# Patient Record
Sex: FEMALE | Race: WHITE | NOT HISPANIC OR LATINO | Employment: FULL TIME | RURAL
[De-identification: names, ages, dates, MRNs, and addresses within clinical notes are randomized per-mention and may not be internally consistent; named-entity substitution may affect disease eponyms.]

---

## 2022-04-04 DIAGNOSIS — Z11.59 SCREENING FOR VIRAL DISEASE: Primary | ICD-10-CM

## 2022-04-05 RX ORDER — SEMAGLUTIDE 1.34 MG/ML
INJECTION, SOLUTION SUBCUTANEOUS
COMMUNITY

## 2022-04-05 RX ORDER — TIZANIDINE 4 MG/1
4 TABLET ORAL EVERY 12 HOURS
COMMUNITY

## 2022-04-05 RX ORDER — OXYCODONE AND ACETAMINOPHEN 10; 325 MG/1; MG/1
1 TABLET ORAL EVERY 8 HOURS PRN
Status: ON HOLD | COMMUNITY
End: 2023-01-24 | Stop reason: HOSPADM

## 2022-04-05 RX ORDER — LEVOTHYROXINE SODIUM 25 UG/1
25 TABLET ORAL
COMMUNITY

## 2022-04-05 RX ORDER — DULAGLUTIDE 0.75 MG/.5ML
INJECTION, SOLUTION SUBCUTANEOUS
COMMUNITY

## 2022-04-06 ENCOUNTER — ANESTHESIA (OUTPATIENT)
Dept: PAIN MEDICINE | Facility: HOSPITAL | Age: 47
End: 2022-04-06
Payer: COMMERCIAL

## 2022-04-06 ENCOUNTER — HOSPITAL ENCOUNTER (OUTPATIENT)
Facility: HOSPITAL | Age: 47
Discharge: HOME | End: 2022-04-06
Attending: ANESTHESIOLOGY | Admitting: ANESTHESIOLOGY
Payer: COMMERCIAL

## 2022-04-06 ENCOUNTER — ANESTHESIA EVENT (OUTPATIENT)
Dept: PAIN MEDICINE | Facility: HOSPITAL | Age: 47
End: 2022-04-06
Payer: COMMERCIAL

## 2022-04-06 VITALS
HEART RATE: 82 BPM | HEART RATE: 71 BPM | OXYGEN SATURATION: 100 % | DIASTOLIC BLOOD PRESSURE: 69 MMHG | BODY MASS INDEX: 32.99 KG/M2 | TEMPERATURE: 98 F | SYSTOLIC BLOOD PRESSURE: 114 MMHG | WEIGHT: 198 LBS | RESPIRATION RATE: 21 BRPM | RESPIRATION RATE: 10 BRPM | DIASTOLIC BLOOD PRESSURE: 62 MMHG | OXYGEN SATURATION: 100 % | HEIGHT: 65 IN | SYSTOLIC BLOOD PRESSURE: 121 MMHG

## 2022-04-06 DIAGNOSIS — M47.812 CERVICAL SPONDYLOSIS WITHOUT MYELOPATHY: ICD-10-CM

## 2022-04-06 PROCEDURE — 64491 INJ PARAVERT F JNT C/T 2 LEV: CPT | Mod: RT | Performed by: ANESTHESIOLOGY

## 2022-04-06 PROCEDURE — 27000284 HC CANNULA NASAL: Performed by: NURSE ANESTHETIST, CERTIFIED REGISTERED

## 2022-04-06 PROCEDURE — 37000008 HC ANESTHESIA 1ST 15 MINUTES: Performed by: ANESTHESIOLOGY

## 2022-04-06 PROCEDURE — 25000003 PHARM REV CODE 250: Performed by: ANESTHESIOLOGY

## 2022-04-06 PROCEDURE — 63600175 PHARM REV CODE 636 W HCPCS: Performed by: NURSE ANESTHETIST, CERTIFIED REGISTERED

## 2022-04-06 PROCEDURE — 64492 INJ PARAVERT F JNT C/T 3 LEV: CPT | Performed by: ANESTHESIOLOGY

## 2022-04-06 PROCEDURE — 63600175 PHARM REV CODE 636 W HCPCS: Performed by: ANESTHESIOLOGY

## 2022-04-06 PROCEDURE — 27000716 HC OXISENSOR PROBE, ANY SIZE: Performed by: NURSE ANESTHETIST, CERTIFIED REGISTERED

## 2022-04-06 PROCEDURE — D9220A PRA ANESTHESIA: Mod: ,,, | Performed by: NURSE ANESTHETIST, CERTIFIED REGISTERED

## 2022-04-06 PROCEDURE — D9220A PRA ANESTHESIA: ICD-10-PCS | Mod: ,,, | Performed by: NURSE ANESTHETIST, CERTIFIED REGISTERED

## 2022-04-06 PROCEDURE — 27201423 OPTIME MED/SURG SUP & DEVICES STERILE SUPPLY: Performed by: ANESTHESIOLOGY

## 2022-04-06 PROCEDURE — 64490 INJ PARAVERT F JNT C/T 1 LEV: CPT | Mod: RT | Performed by: ANESTHESIOLOGY

## 2022-04-06 PROCEDURE — 25000003 PHARM REV CODE 250: Performed by: NURSE ANESTHETIST, CERTIFIED REGISTERED

## 2022-04-06 RX ORDER — THYROID 30 MG/1
30 TABLET ORAL
COMMUNITY

## 2022-04-06 RX ORDER — TRIAMCINOLONE ACETONIDE 40 MG/ML
INJECTION, SUSPENSION INTRA-ARTICULAR; INTRAMUSCULAR
Status: DISCONTINUED | OUTPATIENT
Start: 2022-04-06 | End: 2022-04-06 | Stop reason: HOSPADM

## 2022-04-06 RX ORDER — PROPOFOL 10 MG/ML
VIAL (ML) INTRAVENOUS
Status: DISCONTINUED | OUTPATIENT
Start: 2022-04-06 | End: 2022-04-06

## 2022-04-06 RX ORDER — SODIUM CHLORIDE 9 MG/ML
500 INJECTION, SOLUTION INTRAVENOUS CONTINUOUS
Status: DISCONTINUED | OUTPATIENT
Start: 2022-04-06 | End: 2022-04-06 | Stop reason: HOSPADM

## 2022-04-06 RX ORDER — LIDOCAINE HYDROCHLORIDE 20 MG/ML
INJECTION, SOLUTION EPIDURAL; INFILTRATION; INTRACAUDAL; PERINEURAL
Status: DISCONTINUED | OUTPATIENT
Start: 2022-04-06 | End: 2022-04-06

## 2022-04-06 RX ORDER — BUPIVACAINE HYDROCHLORIDE 2.5 MG/ML
INJECTION, SOLUTION INFILTRATION; PERINEURAL
Status: DISCONTINUED | OUTPATIENT
Start: 2022-04-06 | End: 2022-04-06 | Stop reason: HOSPADM

## 2022-04-06 RX ADMIN — SODIUM CHLORIDE: 9 INJECTION, SOLUTION INTRAVENOUS at 10:04

## 2022-04-06 RX ADMIN — PROPOFOL 60 MG: 10 INJECTION, EMULSION INTRAVENOUS at 11:04

## 2022-04-06 RX ADMIN — PROPOFOL 30 MG: 10 INJECTION, EMULSION INTRAVENOUS at 11:04

## 2022-04-06 RX ADMIN — LIDOCAINE HYDROCHLORIDE 65 MG: 20 INJECTION, SOLUTION INTRAVENOUS at 11:04

## 2022-04-06 NOTE — ANESTHESIA PREPROCEDURE EVALUATION
04/06/2022  Aleksandra Torres is a 47 y.o., female.  Past Medical History:   Diagnosis Date    Anxiety     Depression     Thyroid disease        History reviewed. No pertinent surgical history.    History reviewed. No pertinent family history.    Social History     Socioeconomic History    Marital status:    Tobacco Use    Smoking status: Never Smoker    Smokeless tobacco: Never Used   Substance and Sexual Activity    Alcohol use: Not Currently       Current Facility-Administered Medications   Medication Dose Route Frequency Provider Last Rate Last Admin    0.9%  NaCl infusion  500 mL Intravenous Continuous Pino Torres MD           Review of patient's allergies indicates:  No Known Allergies    Pre-op Assessment    I have reviewed the Patient Summary Reports.     I have reviewed the Nursing Notes. I have reviewed the NPO Status.   I have reviewed the Medications.     Review of Systems  Anesthesia Hx:  No problems with previous Anesthesia  Denies Family Hx of Anesthesia complications.   Denies Personal Hx of Anesthesia complications.   Social:  Smoker    Cardiovascular:   Exercise tolerance: poor Hypertension hyperlipidemia ECG has been reviewed.    Musculoskeletal:  Musculoskeletal General/Symptoms: low back pain, joint pain.  Denies Lumbar Spine Disorders   Neurological:  Pain Syndrome  Chronic Pain Syndrome   Endocrine:   Diabetes, type 2  Diabetes, Type 2 Diabetes    Psych:   Psychiatric History depression  Anxiety Disorder.  Depression.          Physical Exam  General: Well nourished, Alert, Oriented and Cooperative    Airway:  Mouth Opening: Normal  Neck ROM: Normal ROM    Chest/Lungs:  Normal Respiratory Rate    Heart:  Rate: Normal        Anesthesia Plan  Type of Anesthesia, risks & benefits discussed:    Anesthesia Type: Gen Natural Airway, MAC  Intra-op Monitoring Plan:  Standard ASA Monitors  Post Op Pain Control Plan: multimodal analgesia and IV/PO Opioids PRN  Induction:  IV  Informed Consent: Informed consent signed with the Patient and all parties understand the risks and agree with anesthesia plan.  All questions answered. Patient consented to blood products? Yes  ASA Score: 3  Day of Surgery Review of History & Physical: I have interviewed and examined the patient. I have reviewed the patient's H&P dated: There are no significant changes.     Ready For Surgery From Anesthesia Perspective.     .       Awake/Alert

## 2022-04-06 NOTE — OP NOTE
PREOPERATIVE DIAGNOSIS:     Cervical Spondylosis without myelopathy                                                            Neck Pain         POSTOPERATIVE DIAGNOSIS:   Cervical Spondylosis without myelopathy                                                            Neck Pain         PROCEDURE:   Right C3-4, C4-5, C5-6, C6-7 Cervical Facet Injections under Fluoroscopic Guidance         COMPLICATIONS:  None                                          DRAINS AND PACKS:  None    ANESTHESIA:  MAC                                                  BLOOD LOSS:  None         The patient was identified in the holding area.  The risk and benefits were again explained to the patient.  The patient agreed and consent obtained.   The site was marked with a skin pen.  The patient was taken to the procedure room and placed in the prone position on the C-Arm table.  All pressure points were checked and padded comfortably with the patient was awake.  The patients neck was prepped and draped in the usual sterile fashion.  Anesthesia was initiated.   The patients facet joints at C3-4, C4-5, C5-6 and C6-7 were identified under direct fluoroscopic guidance on the right side.  A skin wheal was raised over each of the targeted areas with 0.25% Marcaine.  A 22 gauge 3 ½ inch needle was advanced into the interarticular surface of each of those levels on the right side.  Then 1.5 milliliters of a solution that contained 40 milligrams of Kenalog diluted into 11 milliliters of 0.25% Marcaine was injected at each level for a total of 9 milliliters.  The needles were removed with its tip intact.  There was adequate hemostasis at the conclusion of the procedure.  The patient tolerated the procedure well with no adverse events.  There was no paresthesia with needle placement or injection.  The patient was taken in stable condition to the holding area and was monitored for the appropriate time of convalescence and discharged to the care of  the patients .      Preoperative pain was  6/10    Postoperative pain was  /10.

## 2022-04-06 NOTE — ANESTHESIA POSTPROCEDURE EVALUATION
Anesthesia Post Evaluation    Patient: Aleksandra Torres    Procedure(s) Performed: Procedure(s) (LRB):  BLOCK, NERVE, FACET JOINT, CERVICAL, MEDIAL BRANCH (Right)    Final Anesthesia Type: general      Patient participation: Yes- Able to Participate  Level of consciousness: awake and alert  Post-procedure vital signs: reviewed and stable  Pain management: adequate  Airway patency: patent    PONV status at discharge: No PONV  Anesthetic complications: no      Cardiovascular status: blood pressure returned to baseline, hemodynamically stable and stable  Respiratory status: unassisted  Hydration status: euvolemic  Follow-up not needed.          Vitals Value Taken Time   /70 04/06/22 1140   Temp 97 F 04/06/22 1148   Pulse 89 04/06/22 1141   Resp 11 04/06/22 1141   SpO2 100 % 04/06/22 1141   Vitals shown include unvalidated device data.      Event Time   Out of Recovery 11:40:00         Pain/Verito Score: Verito Score: 10 (4/6/2022 11:40 AM)

## 2022-04-06 NOTE — TRANSFER OF CARE
"Anesthesia Transfer of Care Note    Patient: Aleksandra Torres    Procedure(s) Performed: Procedure(s) (LRB):  BLOCK, NERVE, FACET JOINT, CERVICAL, MEDIAL BRANCH (Right)    Patient location: Other: [facility]    Anesthesia Type: general    Transport from OR: Transported from OR on room air with adequate spontaneous ventilation    Post pain: adequate analgesia    Post assessment: no apparent anesthetic complications    Post vital signs: stable    Level of consciousness: sedated and responds to stimulation    Nausea/Vomiting: no nausea/vomiting    Complications: none    Transfer of care protocol was followedComments: Good SV continue, NAD noted, VSS, RTRN      Last vitals:   Visit Vitals  /66 (BP Location: Left arm, Patient Position: Lying)   Pulse 78   Temp 36.8 °C (98.2 °F) (Oral)   Resp 16   Ht 5' 5" (1.651 m)   Wt 89.8 kg (198 lb)   SpO2 99%   Breastfeeding No   BMI 32.95 kg/m²     "

## 2023-01-23 DIAGNOSIS — N39.3 SUI (STRESS URINARY INCONTINENCE, FEMALE): Primary | ICD-10-CM

## 2023-01-23 DIAGNOSIS — N39.3 SUI (STRESS URINARY INCONTINENCE, FEMALE): ICD-10-CM

## 2023-01-23 DIAGNOSIS — N39.46 URGE AND STRESS INCONTINENCE: Primary | ICD-10-CM

## 2023-01-23 DIAGNOSIS — N39.46 URGE AND STRESS INCONTINENCE: ICD-10-CM

## 2023-01-23 RX ORDER — SODIUM CHLORIDE, SODIUM LACTATE, POTASSIUM CHLORIDE, CALCIUM CHLORIDE 600; 310; 30; 20 MG/100ML; MG/100ML; MG/100ML; MG/100ML
INJECTION, SOLUTION INTRAVENOUS CONTINUOUS
Status: CANCELLED | OUTPATIENT
Start: 2023-01-24

## 2023-01-23 RX ORDER — NEOMYCIN AND POLYMYXIN B SULFATES 40; 200000 MG/ML; [USP'U]/ML
SOLUTION IRRIGATION ONCE
Status: CANCELLED | OUTPATIENT
Start: 2023-01-24

## 2023-01-24 ENCOUNTER — ANESTHESIA EVENT (OUTPATIENT)
Dept: SURGERY | Facility: HOSPITAL | Age: 48
End: 2023-01-24
Payer: COMMERCIAL

## 2023-01-24 ENCOUNTER — ANESTHESIA (OUTPATIENT)
Dept: SURGERY | Facility: HOSPITAL | Age: 48
End: 2023-01-24
Payer: COMMERCIAL

## 2023-01-24 ENCOUNTER — HOSPITAL ENCOUNTER (OUTPATIENT)
Facility: HOSPITAL | Age: 48
Discharge: HOME | End: 2023-01-24
Attending: UROLOGY | Admitting: UROLOGY
Payer: COMMERCIAL

## 2023-01-24 VITALS
HEART RATE: 95 BPM | TEMPERATURE: 98 F | SYSTOLIC BLOOD PRESSURE: 113 MMHG | RESPIRATION RATE: 16 BRPM | BODY MASS INDEX: 29.99 KG/M2 | OXYGEN SATURATION: 96 % | WEIGHT: 180 LBS | HEIGHT: 65 IN | DIASTOLIC BLOOD PRESSURE: 78 MMHG

## 2023-01-24 DIAGNOSIS — N39.46 URGE AND STRESS INCONTINENCE: ICD-10-CM

## 2023-01-24 DIAGNOSIS — N39.3 SUI (STRESS URINARY INCONTINENCE, FEMALE): ICD-10-CM

## 2023-01-24 LAB
ABORH RETYPE: NORMAL
ANION GAP SERPL CALCULATED.3IONS-SCNC: 11 MMOL/L (ref 7–16)
B-HCG UR QL: NEGATIVE
BASOPHILS # BLD AUTO: 0.07 K/UL (ref 0–0.2)
BASOPHILS NFR BLD AUTO: 0.8 % (ref 0–1)
BUN SERPL-MCNC: 12 MG/DL (ref 7–18)
BUN/CREAT SERPL: 11 (ref 6–20)
CALCIUM SERPL-MCNC: 8.8 MG/DL (ref 8.5–10.1)
CHLORIDE SERPL-SCNC: 106 MMOL/L (ref 98–107)
CO2 SERPL-SCNC: 26 MMOL/L (ref 21–32)
CREAT SERPL-MCNC: 1.05 MG/DL (ref 0.55–1.02)
CTP QC/QA: YES
DIFFERENTIAL METHOD BLD: ABNORMAL
EGFR (NO RACE VARIABLE) (RUSH/TITUS): 65 ML/MIN/1.73M²
EOSINOPHIL # BLD AUTO: 0.19 K/UL (ref 0–0.5)
EOSINOPHIL NFR BLD AUTO: 2.1 % (ref 1–4)
ERYTHROCYTE [DISTWIDTH] IN BLOOD BY AUTOMATED COUNT: 13.2 % (ref 11.5–14.5)
GLUCOSE SERPL-MCNC: 94 MG/DL (ref 74–106)
HCT VFR BLD AUTO: 36.8 % (ref 38–47)
HGB BLD-MCNC: 12 G/DL (ref 12–16)
IMM GRANULOCYTES # BLD AUTO: 0.01 K/UL (ref 0–0.04)
IMM GRANULOCYTES NFR BLD: 0.1 % (ref 0–0.4)
INDIRECT COOMBS: NORMAL
LYMPHOCYTES # BLD AUTO: 2.37 K/UL (ref 1–4.8)
LYMPHOCYTES NFR BLD AUTO: 25.8 % (ref 27–41)
MCH RBC QN AUTO: 27.8 PG (ref 27–31)
MCHC RBC AUTO-ENTMCNC: 32.6 G/DL (ref 32–36)
MCV RBC AUTO: 85.4 FL (ref 80–96)
MONOCYTES # BLD AUTO: 0.84 K/UL (ref 0–0.8)
MONOCYTES NFR BLD AUTO: 9.1 % (ref 2–6)
MPC BLD CALC-MCNC: 10.2 FL (ref 9.4–12.4)
NEUTROPHILS # BLD AUTO: 5.71 K/UL (ref 1.8–7.7)
NEUTROPHILS NFR BLD AUTO: 62.1 % (ref 53–65)
NRBC # BLD AUTO: 0 X10E3/UL
NRBC, AUTO (.00): 0 %
PLATELET # BLD AUTO: 283 K/UL (ref 150–400)
POTASSIUM SERPL-SCNC: 4.4 MMOL/L (ref 3.5–5.1)
RBC # BLD AUTO: 4.31 M/UL (ref 4.2–5.4)
RH BLD: NORMAL
SODIUM SERPL-SCNC: 139 MMOL/L (ref 136–145)
WBC # BLD AUTO: 9.19 K/UL (ref 4.5–11)

## 2023-01-24 PROCEDURE — D9220A PRA ANESTHESIA: Mod: ANES,,, | Performed by: ANESTHESIOLOGY

## 2023-01-24 PROCEDURE — 36415 COLL VENOUS BLD VENIPUNCTURE: CPT | Performed by: UROLOGY

## 2023-01-24 PROCEDURE — 85025 COMPLETE CBC W/AUTO DIFF WBC: CPT | Performed by: UROLOGY

## 2023-01-24 PROCEDURE — D9220A PRA ANESTHESIA: ICD-10-PCS | Mod: CRNA,,, | Performed by: NURSE ANESTHETIST, CERTIFIED REGISTERED

## 2023-01-24 PROCEDURE — D9220A PRA ANESTHESIA: Mod: CRNA,,, | Performed by: NURSE ANESTHETIST, CERTIFIED REGISTERED

## 2023-01-24 PROCEDURE — 81025 URINE PREGNANCY TEST: CPT | Performed by: UROLOGY

## 2023-01-24 PROCEDURE — 93010 EKG 12-LEAD: ICD-10-PCS | Mod: ,,, | Performed by: STUDENT IN AN ORGANIZED HEALTH CARE EDUCATION/TRAINING PROGRAM

## 2023-01-24 PROCEDURE — 25000003 PHARM REV CODE 250: Performed by: UROLOGY

## 2023-01-24 PROCEDURE — 36000706: Performed by: UROLOGY

## 2023-01-24 PROCEDURE — 25000003 PHARM REV CODE 250: Performed by: NURSE ANESTHETIST, CERTIFIED REGISTERED

## 2023-01-24 PROCEDURE — 37000009 HC ANESTHESIA EA ADD 15 MINS: Performed by: UROLOGY

## 2023-01-24 PROCEDURE — 37000008 HC ANESTHESIA 1ST 15 MINUTES: Performed by: UROLOGY

## 2023-01-24 PROCEDURE — 93005 ELECTROCARDIOGRAM TRACING: CPT

## 2023-01-24 PROCEDURE — D9220A PRA ANESTHESIA: ICD-10-PCS | Mod: ANES,,, | Performed by: ANESTHESIOLOGY

## 2023-01-24 PROCEDURE — 71000015 HC POSTOP RECOV 1ST HR: Performed by: UROLOGY

## 2023-01-24 PROCEDURE — C1771 REP DEV, URINARY, W/SLING: HCPCS | Performed by: UROLOGY

## 2023-01-24 PROCEDURE — 86900 BLOOD TYPING SEROLOGIC ABO: CPT | Performed by: UROLOGY

## 2023-01-24 PROCEDURE — 71000033 HC RECOVERY, INTIAL HOUR: Performed by: UROLOGY

## 2023-01-24 PROCEDURE — 63600175 PHARM REV CODE 636 W HCPCS: Performed by: UROLOGY

## 2023-01-24 PROCEDURE — 80048 BASIC METABOLIC PNL TOTAL CA: CPT | Performed by: UROLOGY

## 2023-01-24 PROCEDURE — 27000655: Performed by: ANESTHESIOLOGY

## 2023-01-24 PROCEDURE — 71000016 HC POSTOP RECOV ADDL HR: Performed by: UROLOGY

## 2023-01-24 PROCEDURE — 27000177 HC AIRWAY, LARYNGEAL MASK: Performed by: ANESTHESIOLOGY

## 2023-01-24 PROCEDURE — 93010 ELECTROCARDIOGRAM REPORT: CPT | Mod: ,,, | Performed by: STUDENT IN AN ORGANIZED HEALTH CARE EDUCATION/TRAINING PROGRAM

## 2023-01-24 PROCEDURE — 36000707: Performed by: UROLOGY

## 2023-01-24 PROCEDURE — 63600175 PHARM REV CODE 636 W HCPCS: Performed by: NURSE ANESTHETIST, CERTIFIED REGISTERED

## 2023-01-24 DEVICE — SLING ALTIS SINGLE INCISION: Type: IMPLANTABLE DEVICE | Site: PELVIS | Status: FUNCTIONAL

## 2023-01-24 RX ORDER — OXYCODONE HYDROCHLORIDE 5 MG/1
5 TABLET ORAL
Status: DISCONTINUED | OUTPATIENT
Start: 2023-01-24 | End: 2023-01-24 | Stop reason: HOSPADM

## 2023-01-24 RX ORDER — MORPHINE SULFATE 10 MG/ML
4 INJECTION INTRAMUSCULAR; INTRAVENOUS; SUBCUTANEOUS EVERY 5 MIN PRN
Status: DISCONTINUED | OUTPATIENT
Start: 2023-01-24 | End: 2023-01-24 | Stop reason: HOSPADM

## 2023-01-24 RX ORDER — PROPOFOL 10 MG/ML
VIAL (ML) INTRAVENOUS
Status: DISCONTINUED | OUTPATIENT
Start: 2023-01-24 | End: 2023-01-24

## 2023-01-24 RX ORDER — NEOMYCIN AND POLYMYXIN B SULFATES 40; 200000 MG/ML; [USP'U]/ML
SOLUTION IRRIGATION ONCE
Status: DISCONTINUED | OUTPATIENT
Start: 2023-01-24 | End: 2023-01-24 | Stop reason: HOSPADM

## 2023-01-24 RX ORDER — SODIUM CHLORIDE, SODIUM LACTATE, POTASSIUM CHLORIDE, CALCIUM CHLORIDE 600; 310; 30; 20 MG/100ML; MG/100ML; MG/100ML; MG/100ML
INJECTION, SOLUTION INTRAVENOUS CONTINUOUS
Status: DISCONTINUED | OUTPATIENT
Start: 2023-01-24 | End: 2023-01-24 | Stop reason: HOSPADM

## 2023-01-24 RX ORDER — DEXAMETHASONE SODIUM PHOSPHATE 4 MG/ML
INJECTION, SOLUTION INTRA-ARTICULAR; INTRALESIONAL; INTRAMUSCULAR; INTRAVENOUS; SOFT TISSUE
Status: DISCONTINUED | OUTPATIENT
Start: 2023-01-24 | End: 2023-01-24

## 2023-01-24 RX ORDER — SODIUM CHLORIDE, SODIUM LACTATE, POTASSIUM CHLORIDE, CALCIUM CHLORIDE 600; 310; 30; 20 MG/100ML; MG/100ML; MG/100ML; MG/100ML
125 INJECTION, SOLUTION INTRAVENOUS CONTINUOUS
Status: DISCONTINUED | OUTPATIENT
Start: 2023-01-24 | End: 2023-01-24 | Stop reason: HOSPADM

## 2023-01-24 RX ORDER — SODIUM CHLORIDE, SODIUM LACTATE, POTASSIUM CHLORIDE, CALCIUM CHLORIDE 600; 310; 30; 20 MG/100ML; MG/100ML; MG/100ML; MG/100ML
INJECTION, SOLUTION INTRAVENOUS CONTINUOUS PRN
Status: DISCONTINUED | OUTPATIENT
Start: 2023-01-24 | End: 2023-01-24

## 2023-01-24 RX ORDER — HYDROCODONE BITARTRATE AND ACETAMINOPHEN 5; 325 MG/1; MG/1
1 TABLET ORAL EVERY 6 HOURS PRN
Status: DISCONTINUED | OUTPATIENT
Start: 2023-01-24 | End: 2023-01-24 | Stop reason: HOSPADM

## 2023-01-24 RX ORDER — ONDANSETRON 2 MG/ML
INJECTION INTRAMUSCULAR; INTRAVENOUS
Status: DISCONTINUED | OUTPATIENT
Start: 2023-01-24 | End: 2023-01-24

## 2023-01-24 RX ORDER — DIPHENHYDRAMINE HYDROCHLORIDE 50 MG/ML
25 INJECTION INTRAMUSCULAR; INTRAVENOUS EVERY 6 HOURS PRN
Status: DISCONTINUED | OUTPATIENT
Start: 2023-01-24 | End: 2023-01-24 | Stop reason: HOSPADM

## 2023-01-24 RX ORDER — KETOROLAC TROMETHAMINE 30 MG/ML
INJECTION, SOLUTION INTRAMUSCULAR; INTRAVENOUS
Status: DISCONTINUED | OUTPATIENT
Start: 2023-01-24 | End: 2023-01-24

## 2023-01-24 RX ORDER — LIDOCAINE HYDROCHLORIDE 20 MG/ML
INJECTION, SOLUTION EPIDURAL; INFILTRATION; INTRACAUDAL; PERINEURAL
Status: DISCONTINUED | OUTPATIENT
Start: 2023-01-24 | End: 2023-01-24

## 2023-01-24 RX ORDER — FENTANYL CITRATE 50 UG/ML
INJECTION, SOLUTION INTRAMUSCULAR; INTRAVENOUS
Status: DISCONTINUED | OUTPATIENT
Start: 2023-01-24 | End: 2023-01-24

## 2023-01-24 RX ORDER — MEPERIDINE HYDROCHLORIDE 25 MG/ML
25 INJECTION INTRAMUSCULAR; INTRAVENOUS; SUBCUTANEOUS EVERY 10 MIN PRN
Status: DISCONTINUED | OUTPATIENT
Start: 2023-01-24 | End: 2023-01-24 | Stop reason: HOSPADM

## 2023-01-24 RX ORDER — HYDROMORPHONE HYDROCHLORIDE 2 MG/ML
0.5 INJECTION, SOLUTION INTRAMUSCULAR; INTRAVENOUS; SUBCUTANEOUS EVERY 5 MIN PRN
Status: DISCONTINUED | OUTPATIENT
Start: 2023-01-24 | End: 2023-01-24 | Stop reason: HOSPADM

## 2023-01-24 RX ORDER — ONDANSETRON 2 MG/ML
4 INJECTION INTRAMUSCULAR; INTRAVENOUS DAILY PRN
Status: DISCONTINUED | OUTPATIENT
Start: 2023-01-24 | End: 2023-01-24 | Stop reason: HOSPADM

## 2023-01-24 RX ADMIN — HYDROCODONE BITARTRATE AND ACETAMINOPHEN 1 TABLET: 5; 325 TABLET ORAL at 11:01

## 2023-01-24 RX ADMIN — ONDANSETRON 4 MG: 2 INJECTION INTRAMUSCULAR; INTRAVENOUS at 09:01

## 2023-01-24 RX ADMIN — KETOROLAC TROMETHAMINE 30 MG: 30 INJECTION, SOLUTION INTRAMUSCULAR at 10:01

## 2023-01-24 RX ADMIN — LIDOCAINE HYDROCHLORIDE 60 MG: 20 INJECTION, SOLUTION INTRAVENOUS at 09:01

## 2023-01-24 RX ADMIN — FENTANYL CITRATE 50 MCG: 50 INJECTION INTRAMUSCULAR; INTRAVENOUS at 09:01

## 2023-01-24 RX ADMIN — FENTANYL CITRATE 50 MCG: 50 INJECTION INTRAMUSCULAR; INTRAVENOUS at 10:01

## 2023-01-24 RX ADMIN — SODIUM CHLORIDE, POTASSIUM CHLORIDE, SODIUM LACTATE AND CALCIUM CHLORIDE: 600; 310; 30; 20 INJECTION, SOLUTION INTRAVENOUS at 09:01

## 2023-01-24 RX ADMIN — PROPOFOL 200 MG: 10 INJECTION, EMULSION INTRAVENOUS at 09:01

## 2023-01-24 RX ADMIN — AMPICILLIN SODIUM AND SULBACTAM SODIUM 3 G: 2; 1 INJECTION, POWDER, FOR SOLUTION INTRAMUSCULAR; INTRAVENOUS at 09:01

## 2023-01-24 RX ADMIN — DEXAMETHASONE SODIUM PHOSPHATE 8 MG: 4 INJECTION, SOLUTION INTRA-ARTICULAR; INTRALESIONAL; INTRAMUSCULAR; INTRAVENOUS; SOFT TISSUE at 09:01

## 2023-01-24 RX ADMIN — FENTANYL CITRATE 100 MCG: 50 INJECTION INTRAMUSCULAR; INTRAVENOUS at 09:01

## 2023-01-24 NOTE — ANESTHESIA PREPROCEDURE EVALUATION
01/24/2023  Aleksandra Torres is a 47 y.o., female.      Pre-op Assessment    I have reviewed the Patient Summary Reports.     I have reviewed the Nursing Notes. I have reviewed the NPO Status.   I have reviewed the Medications.     Review of Systems  Anesthesia Hx:  No problems with previous Anesthesia    Social:  Non-Smoker, No Alcohol Use    Hematology/Oncology:  Hematology Normal   Oncology Normal     EENT/Dental:EENT/Dental Normal   Cardiovascular:  Cardiovascular Normal     Pulmonary:  Pulmonary Normal    Renal/:  Renal/ Normal     Hepatic/GI:  Hepatic/GI Normal    Musculoskeletal:  Musculoskeletal Normal    Neurological:   Chronic Pain Syndrome   Endocrine:  Endocrine Normal    Dermatological:  Skin Normal    Psych:   anxiety depression          Physical Exam  General: Well nourished    Airway:  Mallampati: II / II  Mouth Opening: Normal  TM Distance: > 6 cm  Tongue: Normal  Neck ROM: Normal ROM    Chest/Lungs:  Clear to auscultation, Normal Respiratory Rate    Heart:  Rate: Normal  Rhythm: Regular Rhythm        Anesthesia Plan  Type of Anesthesia, risks & benefits discussed:    Anesthesia Type: Gen Supraglottic Airway  Intra-op Monitoring Plan: Standard ASA Monitors  Post Op Pain Control Plan: multimodal analgesia  Induction:  IV  Informed Consent: Informed consent signed with the Patient and all parties understand the risks and agree with anesthesia plan.  All questions answered.   ASA Score: 2  Day of Surgery Review of History & Physical: H&P Update referred to the surgeon/provider.I have interviewed and examined the patient. I have reviewed the patient's H&P dated: There are no significant changes. H&P completed by Anesthesiologist.    Ready For Surgery From Anesthesia Perspective.     .

## 2023-01-24 NOTE — OP NOTE
01/24/2023    Preoperative diagnosis: Stress urine incontinence.      Postop diagnosis: Same.      Procedure:  Placement of mid urethral sling.      Surgeon:  Oscar Rizo MD    Complications: None.      EBL:  10 cc.      Brief clinical summary:  47-year-old white female who was evaluated for incontinence.  Found to have stress urinary incontinence and hypermobile system.  She desires for this corrected.  Her evaluation revealed the hypermobile system.  Bladder was clear.    We discussed the placement of mid urethral sling.  This procedure was gone over in detail.  Risks, complications, outcomes, sequelae, prognosis and alternative therapy.  Complications include but are not limited to bleeding, infection, erosion, extrusion, urinary retention that may require intermittent catheterization for a period of time and potential repeat surgery.  Your ureteral injury is extremely rare as obturator nerve injuries extremely rare.  Risks and benefits gone over.  Patient understood and agreed to proceed.      Patient was brought to the operative suite placed table in the supine position.  She was given a general LMA anesthetic which tolerated well and placed in lithotomy position prepared and draped in the usual sterile manner.      Time-out performed.      Weighted speculum was placed in the vagina.  The vagina urethra meatus was inspected.  The urethra is palpated.  A 16 Lazcano is inserted the bladder was drained and the Lazcano was clamped.    2% xylocaine with epinephrine use use infiltrate the vaginal mucosa and suburethral area along with a lateral fornix down the endopelvic fascia on both sides.    An incision was created in the vaginal mucosa overlying the urethra at about mid urethra and extended slightly distally and anteriorly.  Meds mom's use dissect the vaginal mucosa off the urethra.  Dissection was continued laterally to the endopelvic fascia on both sides.  With the index finger of the right hand I can palpate  the obturator notch and the pubic bone.  This was done on both sides.      These wounds were irrigated antibiotic solution and drained.  The Lazcano catheter was then drained again from residual urine in the bladder.    The sling was prepared brought to the field.  I initially placed the dynamic anchor on the patient's right-hand side.  This trocar was then placed in the wound placed up to the obturator notch and the obturator membrane is then pierced.  The trocar was then rotated and removed.  With moderate tension on the sling it is in good position and seated well.    The dynamic anchor is then seated on the opposite trocar and this again was passed in the vagina to the obturator membrane palpating the notch and the bone and then this was then pierced and again rotated up after adequately piercing the membrane.  The trocar was removed.  With some tension on the sling this is in good position also.      Twenty-one Colombian cystourethroscope was passed in the urethra in the bladder the bladder was inspected.  There was no injury.  There was no foreign bodies.  Ureteral orifices were normal with clear efflux.  The urethra was also examined and there is no foreign bodies or masses.  The bladder filled about 250 cc and the sling was then tensioned.  The Lazcano catheter was then inserte in the bladder and the bladder drained and it was left to gravity drainage.  The wounds were irrigated with antibiotic solution.  Vaginal wound was closed in 2 layers with a running 2-0 Vicryl on the vaginal area and on the vaginal mucosa.  Antibiotic ointment was used to cover the suture line.  Lazcano catheter was left to gravity drainage.  Patient was then awakened from general anesthesia having tolerated procedure well was sent to recovery room in stable condition.  All sponge, needle and instrument counts correct x2.

## 2023-01-24 NOTE — OR NURSING
1021 REC'D TO PACU INSTABLE COND. PT SLEEPING, WAKES TO VOICE. CONNECTED TO BP CUFF, EKG LEADS & SPO2 MONITORS. VS STABLE. PT HAD A URETERAL SLING DONE TODAY. NO DISTRESS NOTED @ THIS TIME. WILL CONT TO MONITOR.      1050 TRANSFERRED TO OUT PT ROOM 3 IN STABLE COND. VS STABLE. BEDSIDE REPORT GIVEN TO WINTER NIELSON RN. NO DISTRESS NOTED @ THIS TIME.

## 2023-01-24 NOTE — DISCHARGE SUMMARY
[facility] - Periop Services  Discharge Note  Short Stay    Procedure(s) (LRB):  URETHROPEXY, RETROPUBIC APPROACH, USING MIDURETHRAL SLING (N/A)      OUTCOME: Patient tolerated treatment/procedure well without complication and is now ready for discharge.    DISPOSITION: Home or Self Care    FINAL DIAGNOSIS:  Stress incontinence of urine    FOLLOWUP: In clinic    DISCHARGE INSTRUCTIONS:  No discharge procedures on file.     TIME SPENT ON DISCHARGE: 10   minutes

## 2023-01-24 NOTE — ANESTHESIA PROCEDURE NOTES
Intubation    Date/Time: 1/24/2023 9:14 AM  Performed by: Brooke Luu CRNA  Authorized by: Cy Marion MD     Intubation:     Induction:  Intravenous    Intubated:  Postinduction    Mask Ventilation:  Easy mask    Attempts:  1    Attempted By:  CRNA    Difficult Airway Encountered?: No      Complications:  None    Airway Device:  Supraglottic airway/LMA    Airway Device Size:  4.0    Style/Cuff Inflation:  Cuffed (inflated to minimal occlusive pressure)    Placement Verified By:  Capnometry    Complicating Factors:  None    Findings Post-Intubation:  Atraumatic/condition of teeth unchanged and BS equal bilateral

## 2023-01-24 NOTE — DISCHARGE INSTRUCTIONS
Return to dr eduardo office at 10 am tomorrow for catheter removal. Take prescriptions as directed. Follow up appointment with dr eduardo is 2/7/2023 at 3:30 pm

## 2023-01-24 NOTE — TRANSFER OF CARE
"Anesthesia Transfer of Care Note    Patient: Aleskandra Torres    Procedure(s) Performed: Procedure(s) (LRB):  URETHROPEXY, RETROPUBIC APPROACH, USING MIDURETHRAL SLING (N/A)    Patient location: PACU    Anesthesia Type: general    Transport from OR: Transported from OR on 6-10 L/min O2 by face mask with adequate spontaneous ventilation    Post pain: adequate analgesia    Post assessment: no apparent anesthetic complications    Post vital signs: stable    Level of consciousness: responds to stimulation and awake    Nausea/Vomiting: no nausea/vomiting    Complications: none    Transfer of care protocol was followedComments: Good SV continue, NAD noted, VSS, RTRN      Last vitals:   Visit Vitals  /64   Pulse 104   Temp 36.6 °C (97.9 °F)   Resp 16   Ht 5' 5" (1.651 m)   Wt 81.6 kg (180 lb)   SpO2 100%   Breastfeeding No   BMI 29.95 kg/m²     "

## 2023-01-24 NOTE — ANESTHESIA POSTPROCEDURE EVALUATION
Anesthesia Post Evaluation    Patient: Aleksandra Torres    Procedure(s) Performed: Procedure(s) (LRB):  URETHROPEXY, RETROPUBIC APPROACH, USING MIDURETHRAL SLING (N/A)    Final Anesthesia Type: general      Patient location during evaluation: PACU  Patient participation: Yes- Able to Participate  Level of consciousness: awake and sedated  Post-procedure vital signs: reviewed and stable  Pain management: adequate  Airway patency: patent    PONV status at discharge: No PONV  Anesthetic complications: no      Cardiovascular status: blood pressure returned to baseline  Respiratory status: unassisted  Hydration status: euvolemic  Follow-up not needed.          Vitals Value Taken Time   /68 01/24/23 1055   Temp 36.6 °C (97.9 °F) 01/24/23 1025   Pulse 107 01/24/23 1114   Resp 16 01/24/23 1055   SpO2 99 % 01/24/23 1114   Vitals shown include unvalidated device data.      Event Time   Out of Recovery 10:50:00         Pain/Verito Score: Verito Score: 9 (1/24/2023 10:55 AM)

## 2023-01-24 NOTE — PROGRESS NOTES
01/24/2023    Marquis Rizo MD    47-year-old white female with stress urinary incontinence for mid urethral sling.  We had previously discussed this and gone over this in detail.  She is stable for surgery.  I answered multiple questions and again went over postoperative care and activity.  Again, risks, complications, outcomes, sequelae, prognosis and alternative therapies explained.  Complications include but are not limited to infection, bleeding, erosion, extrusion, ureteral obstruction/injury, repeat surgery.  Risks and benefits gone over with the patient and her .  They understand and agree to proceed.      Exam: Chest: Clear.              Cardiovascular:  Regular rate and rhythm.                 ABD:  Soft, flat, nontender no masses.  No flank masses.  No CVA tenderness.     Impression: Stress urinary incontinence.  Stable for surgery.  No new changes to her history and physical.      Plan:  Mid urethral sling under anesthesia as an outpatient.

## (undated) DEVICE — TRAY VAGINAL WET PREP

## (undated) DEVICE — GLOVE SURGICAL PROTEXIS PI SIZE 6.5

## (undated) DEVICE — GOWN NONREINF SET-IN SLV 2XL

## (undated) DEVICE — Device

## (undated) DEVICE — DRAPE STERI-DRAPE APERTURE

## (undated) DEVICE — APPLICATOR CHLORAPREP HI-LITE TINTED ORANGE 26ML

## (undated) DEVICE — GLOVE SURGICAL PROTEXIS PI SIZE 7.5

## (undated) DEVICE — SYR MONOJECT LL TIP RIGID 60ML

## (undated) DEVICE — SYR IRRIGATION BULB STER 60ML

## (undated) DEVICE — TRAY NERVE BLOCK (KC) PMA

## (undated) DEVICE — SYR ONLY LUER LOCK 20CC

## (undated) DEVICE — SET IV SOL CONTIN-FLOW 10 DROP/ML (PRIMARY)

## (undated) DEVICE — SUT VICRYL PLUS 2-0 SH 27IN

## (undated) DEVICE — NEEDLE SPINAL 22GX3.5 QUINCHE (KC)

## (undated) DEVICE — MARKER SKIN RULER AND LABEL

## (undated) DEVICE — BLADE SURG #15 CARBON STEEL

## (undated) DEVICE — SET CYSTO IRR DRP CHMBR 84IN

## (undated) DEVICE — CATH IV JELCO 22GX1 IN

## (undated) DEVICE — SOL NACL IRR 1000ML BTL

## (undated) DEVICE — TRAY FOLEY CATH 16FR LATEX FRE

## (undated) DEVICE — GLOVE PROTEXIS PI CLASSIC 7.5

## (undated) DEVICE — KIT IV START 849

## (undated) DEVICE — SYR 30CC LUER LOCK